# Patient Record
Sex: MALE | Race: WHITE | NOT HISPANIC OR LATINO | URBAN - METROPOLITAN AREA
[De-identification: names, ages, dates, MRNs, and addresses within clinical notes are randomized per-mention and may not be internally consistent; named-entity substitution may affect disease eponyms.]

---

## 2017-03-30 ENCOUNTER — OFFICE VISIT - HEALTHEAST (OUTPATIENT)
Dept: FAMILY MEDICINE | Facility: CLINIC | Age: 62
End: 2017-03-30

## 2017-03-30 DIAGNOSIS — E78.2 MIXED HYPERLIPIDEMIA: ICD-10-CM

## 2017-03-30 DIAGNOSIS — J45.20 MILD INTERMITTENT ASTHMA WITHOUT COMPLICATION: ICD-10-CM

## 2017-03-30 DIAGNOSIS — K57.32 DIVERTICULITIS OF COLON: ICD-10-CM

## 2017-03-30 DIAGNOSIS — R19.7 DIARRHEA: ICD-10-CM

## 2017-03-30 RX ORDER — LACTOBACILLUS RHAMNOSUS GG 15B CELL
1 CAPSULE, SPRINKLE ORAL
Status: SHIPPED | COMMUNITY
Start: 2017-03-30

## 2017-03-30 ASSESSMENT — MIFFLIN-ST. JEOR: SCORE: 1604.22

## 2017-03-31 LAB
HAV IGM SERPL QL IA: NEGATIVE
HBV CORE IGM SERPL QL IA: NEGATIVE
HBV SURFACE AG SERPL QL IA: NEGATIVE
HCV AB SERPL QL IA: NEGATIVE

## 2017-04-25 ENCOUNTER — COMMUNICATION - HEALTHEAST (OUTPATIENT)
Dept: SCHEDULING | Facility: CLINIC | Age: 62
End: 2017-04-25

## 2021-05-27 ENCOUNTER — RECORDS - HEALTHEAST (OUTPATIENT)
Dept: ADMINISTRATIVE | Facility: CLINIC | Age: 66
End: 2021-05-27

## 2021-05-29 ENCOUNTER — RECORDS - HEALTHEAST (OUTPATIENT)
Dept: ADMINISTRATIVE | Facility: CLINIC | Age: 66
End: 2021-05-29

## 2021-05-30 ENCOUNTER — RECORDS - HEALTHEAST (OUTPATIENT)
Dept: ADMINISTRATIVE | Facility: CLINIC | Age: 66
End: 2021-05-30

## 2021-05-30 VITALS — BODY MASS INDEX: 28.06 KG/M2 | WEIGHT: 185.13 LBS | HEIGHT: 68 IN

## 2021-06-09 NOTE — PROGRESS NOTES
Assessment & Plan:  1. Diarrhea  Ongoing diarrhea, previous visit stool culture and C. difficile were obtained and negative.  No pain on exam and no symptoms of infection.  Does have recent travel but did not eat or drink anything at his destination.  He is due for colonoscopy anyway so we will get that scheduled and also check labs to rule out any infectious cause that is not showing up readily on testing.  Otherwise we discussed following up with gastroenterology for further evaluation given the ongoing nature of this problem.  - Hepatic Profile  - HM1(CBC and Differential)  - H. pylori Antibody, IgG  - Lipase  - Amylase  - HM1 (CBC with Diff)  - Hepatitis Acute Evaluation  - Ambulatory referral for Colonoscopy  - Ambulatory referral to Gastroenterology    2. Diverticulitis of colon  History of diverticulitis with sigmoid resection in the past.  He has not continue to follow with gastroenterology for routine management of colonoscopy or other follow-up.  Will schedule colonoscopy today and have him follow-up with gastro-.    3. Mild intermittent asthma without complication  Stable without use of medications currently.  Follow-up as necessary.          There are no Patient Instructions on file for this visit.    Orders Placed This Encounter   Procedures     Hepatic Profile     H. pylori Antibody, IgG     Lipase     Amylase     HM1 (CBC with Diff)     Hepatitis Acute Evaluation     Ambulatory referral for Colonoscopy     Referral Priority:   Routine     Referral Type:   Colonoscopy     Referral Reason:   Evaluation and Treatment     Requested Specialty:   Gastroenterology     Number of Visits Requested:   1     Ambulatory referral to Gastroenterology     Referral Priority:   Routine     Referral Type:   Consultation     Referral Reason:   Evaluation and Treatment     Requested Specialty:   Gastroenterology     Number of Visits Requested:   1     There are no discontinued medications.      Chief Complaint:   Chief  "Complaint   Patient presents with     Diarrhea     x 1 mo       History of Present Illness:  Ernesto is a 62 y.o. male presenting to the clinic today 1 month of diarrhea.  He has been having loose stools several per day.  On average he did state about 6 stools per day.  He does have a history of diverticula noted low cyst and diverticulitis with a previous sigmoid resection in 2002.  He has been on no recent antibiotics and no changes to his medication regime.  He has not had any abdominal pain or nausea or vomiting.  Had no fever or chills.  He has not followed up or kept in touch with any gastroenterologist or surgeon post surgery.  His last colonoscopy was about 15 years ago.  He believes he may have found some polyps.  He does take probiotics and digestive enzymes daily since the beginning of this illness to try to improve things.  Recently he was seen at urgent care for similar symptoms, about 2 weeks ago.  At that time they collected stool specimens for C. difficile and stool culture and nothing was found.  They also did lab work which was unremarkable.  He is not sure what next steps are but would like further investigation into why this is ongoing..     Review of Systems:  All other systems are negative except as noted above.    PFS:  Reviewed and updated.     Tobacco Use:  History   Smoking Status     Never Smoker   Smokeless Tobacco     Not on file       Vitals:  Vitals:    03/30/17 1508   BP: 140/72   Patient Site: Right Arm   Patient Position: Sitting   Cuff Size: Adult Regular   Pulse: 76   Resp: 16   Weight: 185 lb 2 oz (84 kg)   Height: 5' 8\" (1.727 m)     Wt Readings from Last 3 Encounters:   03/30/17 185 lb 2 oz (84 kg)       Physical Exam:  Constitutional:  Reveals an alert, cooperative, 62 year old male in no acute distress.  Vitals:  Per nursing notes.  Cardiovascular:  Regular rate and rhythm without murmurs, rubs, or gallops. Carotids without bruits. Legs without edema.   Respiratory: Clear.  " Respiratory effort normal.  GI: Abdomen protrudes slightly, but is nontender to palpation.  Due to previous abdominal surgery and scar has bellybutton protrudes slightly, this is normal for him.  No area of the abdomen tender to palpation.    Non-distended.  Neither liver nor spleen palpable.  Psychiatric:  Mood appropriate, and oriented ×3    Data Reviewed:  Additional History from Old Records or Another Person Summarized (2 total): None.     Decision to Obtain Extra information (1 total): None.     Radiology Tests Summarized and Ordered (XRAY/CT/MRI/DXA) (1 total): None.    Labs Reviewed and Ordered (1 total): Labs reviewed and ordered.    Medicine Tests Summarized and Ordered (EKG/ECHO/COLONOSCOPY/EGD) (1 total): None.    Independent Review of EKG or X-Ray (2 each): None.    The visit lasted a total of 25 minutes face to face with the patient. Over 50% of the time was spent counseling and educating the patient about Plan of care.    Medications:  Current Outpatient Prescriptions   Medication Sig Dispense Refill     L. ACIDOPHILUS/BIFID. ANIMALIS ([employer] ORAL) Take by mouth.       Lactobacillus rhamnosus GG (CULTURELLE) 15 billion cell CpSP Take 1 capsule by mouth 3 (three) times a day with meals.       No current facility-administered medications for this visit.        Total Data Points: 1    REJI Alejandre, CNP    This note has been dictated using voice recognition software. Any grammatical or context distortions are unintentional and inherent to the software

## 2021-06-09 NOTE — PROGRESS NOTES
Labs reviewed and within normal limits. I have placed a referral for patient to see gastroenterology for further evaluation.